# Patient Record
Sex: MALE | Race: BLACK OR AFRICAN AMERICAN | NOT HISPANIC OR LATINO
[De-identification: names, ages, dates, MRNs, and addresses within clinical notes are randomized per-mention and may not be internally consistent; named-entity substitution may affect disease eponyms.]

---

## 2023-03-09 ENCOUNTER — APPOINTMENT (OUTPATIENT)
Dept: HUMAN REPRODUCTION | Facility: CLINIC | Age: 48
End: 2023-03-09

## 2023-04-21 PROBLEM — Z00.00 ENCOUNTER FOR PREVENTIVE HEALTH EXAMINATION: Status: ACTIVE | Noted: 2023-04-21

## 2023-04-25 ENCOUNTER — APPOINTMENT (OUTPATIENT)
Dept: ORTHOPEDIC SURGERY | Facility: CLINIC | Age: 48
End: 2023-04-25
Payer: COMMERCIAL

## 2023-04-25 VITALS
BODY MASS INDEX: 20.53 KG/M2 | HEIGHT: 74 IN | SYSTOLIC BLOOD PRESSURE: 132 MMHG | HEART RATE: 92 BPM | WEIGHT: 160 LBS | DIASTOLIC BLOOD PRESSURE: 86 MMHG

## 2023-04-25 DIAGNOSIS — Z87.09 PERSONAL HISTORY OF OTHER DISEASES OF THE RESPIRATORY SYSTEM: ICD-10-CM

## 2023-04-25 DIAGNOSIS — M54.9 DORSALGIA, UNSPECIFIED: ICD-10-CM

## 2023-04-25 DIAGNOSIS — M47.812 SPONDYLOSIS W/OUT MYELOPATHY OR RADICULOPATHY, CERVICAL REGION: ICD-10-CM

## 2023-04-25 PROCEDURE — 72082 X-RAY EXAM ENTIRE SPI 2/3 VW: CPT

## 2023-04-25 PROCEDURE — 99204 OFFICE O/P NEW MOD 45 MIN: CPT

## 2023-04-25 PROCEDURE — 72040 X-RAY EXAM NECK SPINE 2-3 VW: CPT

## 2023-04-25 PROCEDURE — 72070 X-RAY EXAM THORAC SPINE 2VWS: CPT

## 2023-04-25 PROCEDURE — 72100 X-RAY EXAM L-S SPINE 2/3 VWS: CPT

## 2023-04-27 PROBLEM — Z87.09 HISTORY OF ASTHMA: Status: RESOLVED | Noted: 2023-04-27 | Resolved: 2023-04-27

## 2023-04-27 PROBLEM — M54.9 ACUTE UPPER BACK PAIN: Status: ACTIVE | Noted: 2023-04-27

## 2023-04-27 PROBLEM — M47.812 CERVICAL SPONDYLOSIS: Status: ACTIVE | Noted: 2023-04-27

## 2023-04-27 NOTE — HISTORY OF PRESENT ILLNESS
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  It acts up rarely.  1 month ago he had an increase of midline upper back and neck pain as well as mid upper thoracic pain and lower back pain.  The upper back and neck symptoms do not radiate to the arms.  That pain is graded as a 10.  The lower back pain is graded as a 7 and for 1 week he has had some radiation of the pain to his legs.  The pain is no worse coughing, sneezing or forcing to move his bowels.  He has not had night pain.  He gets some rare numbness and tingling in his arms or legs.  The pain is no worse sitting.  It is worse standing for 30 minutes but no worse walking.  He has been on Naprosyn 500 mg twice a day for a week or so currently without benefit.  He has some complaints of dizziness with prolonged standing which is difficult to explain.  He does have some seemingly innocent daily habits that may well aggravate and propagate neck related symptoms. [Pain Location] : pain [Worsening] : worsening [10] : a maximum pain level of 10/10

## 2023-04-27 NOTE — PHYSICAL EXAM
[de-identified] : He is fully alert and oriented with a normal mood and affect.  He is in no acute distress as a take the history.  He ambulates with a normal gait including tiptoe and heel walking.  There is no evidence of unsteadiness or spasticity.  There are no cutaneous abnormalities or palpable bony defects of the spine.  There is no evidence of shortness of breath or respiratory distress.  There is no paravertebral muscle spasm, sciatic notch tenderness or trochanteric tenderness.  Forward flexion of the spine is easily to 70 degrees without complaints of back pain.  His lower extremity neurological examination revealed 1+ symmetrical reflexes.  Toes are downgoing.  Motor power is normal to manual testing in all lower extremity groups and sensation is normal to light touch in all dermatomes.  Straight leg raising is negative to 90 degrees in the sitting position bilaterally.  His hips and his knees have a full and painless range of motion with normal stability.  Vascular examination shows no evidence of varicosities and there is no lymphedema.  His upper extremity neurological examination again reveals 1+ symmetrical reflexes with normal motor power in all upper extremity groups and normal sensation in all dermatomes.  Shoulder range of motion is full, painless and symmetrical.  Range of motion of the cervical spine shows flexion with the chin reaching to within 1 fingerbreadth of the chest, extension of 70 degrees with rotation of 70 degrees bilaterally and tilt of 30 degrees bilaterally with some mild discomfort at the extremes. [de-identified] : In light of his complaints AP and lateral x-rays of the cervical, thoracic and lumbar spine are obtained.\par \par AP and lateral x-rays of the cervical spine reveal some mild degenerative changes at multiple levels.  Sagittal alignment is normal and there are no destructive changes.\par \par AP and lateral x-rays of the thoracic spine again reveal mild degenerative changes there is a mildly increased kyphosis.  Sagittal alignment is otherwise normal and there are no destructive changes.\par \par AP and lateral x-rays of the lumbar spine reveal a partially sacralized L5.  Sagittal alignment is normal and there are no destructive changes.

## 2023-04-27 NOTE — DISCUSSION/SUMMARY
[Medication Risks Reviewed] : Medication risks reviewed [de-identified] : He will rest and use moist heat.  We discussed some daily activities he needs to avoid which may well be aggravating or propagating the symptoms.  He will continue the Naprosyn 500 mg twice a day which she has been taking for a week.  He will call me in 7 or 10 days and if he has not seen any benefit we will change his the nonsteroidal anti-inflammatory.  I will see him for follow-up in 4 weeks.  Dizziness with standing is something that does not normally related to spine problems.  Today's blood pressure was normal but his resting heart rate is 92 which at his age is evaded from what I would expect and of uncertain significance.  He will speak with his primary care physician regarding his dizziness issues.

## 2023-04-27 NOTE — REASON FOR VISIT
[Initial Visit] : an initial visit for [Degenerative Joint Disease] : degenerative joint disease [Back Pain] : back pain [Neck Pain] : neck pain

## 2023-09-28 ENCOUNTER — APPOINTMENT (OUTPATIENT)
Dept: HUMAN REPRODUCTION | Facility: CLINIC | Age: 48
End: 2023-09-28

## 2024-01-31 ENCOUNTER — APPOINTMENT (OUTPATIENT)
Dept: HUMAN REPRODUCTION | Facility: CLINIC | Age: 49
End: 2024-01-31

## 2024-02-01 ENCOUNTER — APPOINTMENT (OUTPATIENT)
Dept: HUMAN REPRODUCTION | Facility: CLINIC | Age: 49
End: 2024-02-01

## 2024-02-07 ENCOUNTER — APPOINTMENT (OUTPATIENT)
Dept: UROLOGY | Facility: CLINIC | Age: 49
End: 2024-02-07
Payer: COMMERCIAL

## 2024-02-07 VITALS
DIASTOLIC BLOOD PRESSURE: 82 MMHG | SYSTOLIC BLOOD PRESSURE: 119 MMHG | HEIGHT: 74 IN | OXYGEN SATURATION: 98 % | RESPIRATION RATE: 16 BRPM | HEART RATE: 95 BPM | WEIGHT: 160 LBS | BODY MASS INDEX: 20.53 KG/M2

## 2024-02-07 DIAGNOSIS — R68.82 DECREASED LIBIDO: ICD-10-CM

## 2024-02-07 DIAGNOSIS — Z78.9 OTHER SPECIFIED HEALTH STATUS: ICD-10-CM

## 2024-02-07 PROCEDURE — 99204 OFFICE O/P NEW MOD 45 MIN: CPT

## 2024-02-07 NOTE — ASSESSMENT
[FreeTextEntry1] : 48-year-old male who presents for:  1.  Microhematuria-we will proceed with renal bladder ultrasound and cystoscopy per guidelines  2.  Low libido-we will obtain testosterone level.  Advised him to go in the morning for more accurate assessment  Return to clinic for cystoscopy after renal bladder ultrasound

## 2024-02-07 NOTE — PHYSICAL EXAM
[Normal Appearance] : normal appearance [Well Groomed] : well groomed [General Appearance - In No Acute Distress] : no acute distress [Normal Station and Gait] : the gait and station were normal for the patient's age [] : no rash [No Focal Deficits] : no focal deficits [Oriented To Time, Place, And Person] : oriented to person, place, and time [Affect] : the affect was normal [Mood] : the mood was normal

## 2024-02-07 NOTE — REVIEW OF SYSTEMS
[Told you have blood in urine on a urine test] : told blood was present in a urine test [Wake up at night to urinate  How many times?  ___] : wakes up to urinate [unfilled] times during the night [Itching] : itching [Negative] : Heme/Lymph

## 2024-02-07 NOTE — REASON FOR VISIT
[TextEntry] : 48-year-old male who presents for microhematuria and low libido  Patient recently spent the last year in [place].  When he returned he went for his annual wellness and had a urinalysis which showed 2-5 red blood cells.  He denies any gross hematuria.  He denies any urinary symptoms.  He has been drinking significant water since returning and he feels his urinary frequency is appropriate.  He is a never smoker.  He reports that he has had a low libido recently.  He also experiences fatigue.  He denies any marijuana use or narcotic use.  He denies any cardiac issues.  He denies family history of prostate cancer  He has a history of a herniated disc that he goes to physical therapy for  Outside records reviewed: Creatinine 1.1 UA 2-5 RBCs HbA1c 5.8%

## 2024-02-13 ENCOUNTER — APPOINTMENT (OUTPATIENT)
Dept: ULTRASOUND IMAGING | Facility: CLINIC | Age: 49
End: 2024-02-13

## 2024-02-13 PROBLEM — R31.29 MICROHEMATURIA: Status: ACTIVE | Noted: 2024-02-07

## 2024-02-14 ENCOUNTER — APPOINTMENT (OUTPATIENT)
Dept: UROLOGY | Facility: CLINIC | Age: 49
End: 2024-02-14

## 2024-02-14 DIAGNOSIS — R31.29 OTHER MICROSCOPIC HEMATURIA: ICD-10-CM

## 2024-02-15 ENCOUNTER — APPOINTMENT (OUTPATIENT)
Dept: ORTHOPEDIC SURGERY | Facility: CLINIC | Age: 49
End: 2024-02-15
Payer: COMMERCIAL

## 2024-02-15 VITALS — BODY MASS INDEX: 20.53 KG/M2 | HEIGHT: 74 IN | WEIGHT: 160 LBS

## 2024-02-15 DIAGNOSIS — Z78.9 OTHER SPECIFIED HEALTH STATUS: ICD-10-CM

## 2024-02-15 DIAGNOSIS — M54.9 DORSALGIA, UNSPECIFIED: ICD-10-CM

## 2024-02-15 DIAGNOSIS — G89.29 DORSALGIA, UNSPECIFIED: ICD-10-CM

## 2024-02-15 DIAGNOSIS — M47.814 SPONDYLOSIS W/OUT MYELOPATHY OR RADICULOPATHY, THORACIC REGION: ICD-10-CM

## 2024-02-15 PROCEDURE — 99204 OFFICE O/P NEW MOD 45 MIN: CPT

## 2024-02-15 PROCEDURE — 72040 X-RAY EXAM NECK SPINE 2-3 VW: CPT

## 2024-02-15 PROCEDURE — 72070 X-RAY EXAM THORAC SPINE 2VWS: CPT

## 2024-02-15 PROCEDURE — 72100 X-RAY EXAM L-S SPINE 2/3 VWS: CPT

## 2024-02-15 NOTE — DATA REVIEWED
[MRI] : MRI [Thoracic Spine] : thoracic spine [I independently reviewed and interpreted images and report] : I independently reviewed and interpreted images and report [FreeTextEntry1] : T7-T7 HNP wit mild cord compression

## 2024-02-15 NOTE — ASSESSMENT
[FreeTextEntry1] : 48 M with cervical and thoracic HNP  PT massage Discussed NSAIDAS patient deferred.  Discussed major side effects of medication including but not limited to gastritis and acute kidney injury.  He was instructed to take with food and to discontinue use if stomach or esophageal pain developed.  FU 6 weeks if pain persist consider pain management referral r repeat MRI

## 2024-02-15 NOTE — IMAGING
[Straightening consistent with spasm] : Straightening consistent with spasm [Facet arthropathy] : Facet arthropathy [Disc space narrowing] : Disc space narrowing [No bony abnormalities] : No bony abnormalities

## 2024-02-15 NOTE — HISTORY OF PRESENT ILLNESS
[7] : 7 [de-identified] : 02/15/24; 48 year old RHD Male presents today for evaluation of mid thoracic pain that has been present since 2005 basketball injury but has been worsening over the years has. Has completed course of PT in 2023. Patient reports symptoms worsen when standing for extended period of time. Patient reports also having neck pain/stiffness and intermittent RUE numbness in June 2023 treated with HEP/aqua therapy. RUE Symptoms now have resolved but continues to have neck pain. Denies change in dexterity or fine motor skills.  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

## 2024-02-16 ENCOUNTER — OUTPATIENT (OUTPATIENT)
Dept: OUTPATIENT SERVICES | Facility: HOSPITAL | Age: 49
LOS: 1 days | End: 2024-02-16
Payer: COMMERCIAL

## 2024-02-16 ENCOUNTER — APPOINTMENT (OUTPATIENT)
Dept: ULTRASOUND IMAGING | Facility: CLINIC | Age: 49
End: 2024-02-16
Payer: COMMERCIAL

## 2024-02-16 DIAGNOSIS — R31.29 OTHER MICROSCOPIC HEMATURIA: ICD-10-CM

## 2024-02-16 PROCEDURE — 76770 US EXAM ABDO BACK WALL COMP: CPT

## 2024-02-16 PROCEDURE — 76770 US EXAM ABDO BACK WALL COMP: CPT | Mod: 26

## 2024-03-01 ENCOUNTER — APPOINTMENT (OUTPATIENT)
Dept: UROLOGY | Facility: CLINIC | Age: 49
End: 2024-03-01

## 2024-03-13 ENCOUNTER — APPOINTMENT (OUTPATIENT)
Dept: UROLOGY | Facility: CLINIC | Age: 49
End: 2024-03-13
Payer: COMMERCIAL

## 2024-03-13 VITALS
HEIGHT: 74 IN | HEART RATE: 105 BPM | DIASTOLIC BLOOD PRESSURE: 82 MMHG | SYSTOLIC BLOOD PRESSURE: 117 MMHG | BODY MASS INDEX: 20.53 KG/M2 | RESPIRATION RATE: 16 BRPM | OXYGEN SATURATION: 98 % | WEIGHT: 160 LBS | TEMPERATURE: 98.3 F

## 2024-03-13 PROCEDURE — 52000 CYSTOURETHROSCOPY: CPT

## 2024-03-25 ENCOUNTER — NON-APPOINTMENT (OUTPATIENT)
Age: 49
End: 2024-03-25

## 2024-03-25 ENCOUNTER — APPOINTMENT (OUTPATIENT)
Dept: UROLOGY | Facility: CLINIC | Age: 49
End: 2024-03-25
Payer: COMMERCIAL

## 2024-03-25 VITALS — SYSTOLIC BLOOD PRESSURE: 125 MMHG | DIASTOLIC BLOOD PRESSURE: 79 MMHG | OXYGEN SATURATION: 97 % | HEART RATE: 73 BPM

## 2024-03-25 DIAGNOSIS — R30.0 DYSURIA: ICD-10-CM

## 2024-03-25 PROCEDURE — 99214 OFFICE O/P EST MOD 30 MIN: CPT

## 2024-03-25 PROCEDURE — G2211 COMPLEX E/M VISIT ADD ON: CPT

## 2024-03-25 PROCEDURE — 51798 US URINE CAPACITY MEASURE: CPT

## 2024-03-25 RX ORDER — SOLIFENACIN SUCCINATE 10 MG/1
10 TABLET ORAL
Qty: 30 | Refills: 6 | Status: ACTIVE | COMMUNITY
Start: 2024-03-25 | End: 1900-01-01

## 2024-03-25 RX ORDER — PHENAZOPYRIDINE HYDROCHLORIDE 100 MG/1
100 TABLET ORAL 3 TIMES DAILY
Qty: 21 | Refills: 0 | Status: ACTIVE | COMMUNITY
Start: 2024-03-25 | End: 1900-01-01

## 2024-03-25 NOTE — PHYSICAL EXAM
[Normal Appearance] : normal appearance [Well Groomed] : well groomed [General Appearance - In No Acute Distress] : no acute distress [Abdomen Soft] : soft [Abdomen Tenderness] : non-tender [] : no rash [Normal Station and Gait] : the gait and station were normal for the patient's age [Affect] : the affect was normal [Oriented To Time, Place, And Person] : oriented to person, place, and time [Mood] : the mood was normal

## 2024-03-25 NOTE — ASSESSMENT
[FreeTextEntry1] : 48-year-old male who presents with LUTS in the setting of worsening back issues.   We will send his urine for urinalysis urine culture today.  I also sent Pyridium to the pharmacy for the dysuria that he can use as needed.  I will start him on Vesicare 10 mg for his bladder overactivity.  He will look into pelvic floor physical therapy, he was given information about the [facility] site at [facility].  If he has persistent and worsening urinary symptoms, explained that we will likely proceed with urodynamics  Return to clinic in 6 weeks

## 2024-03-25 NOTE — REASON FOR VISIT
[TextEntry] : 48-year-old male who presents for microhematuria and low libido  Patient recently spent the last year in [place].  When he returned he went for his annual wellness and had a urinalysis which showed 2-5 red blood cells.  He denies any gross hematuria.  He denies any urinary symptoms.  He has been drinking significant water since returning and he feels his urinary frequency is appropriate.  He is a never smoker.  He reports that he has had a low libido recently.  He also experiences fatigue.  He denies any marijuana use or narcotic use.  He denies any cardiac issues.  He denies family history of prostate cancer  He has a history of a herniated disc that he goes to physical therapy for  Outside records reviewed: Creatinine 1.1 UA 2-5 RBCs HbA1c 5.8%  Renal bladder ultrasound was normal.  On 3/13 patient underwent cystoscopy which was unremarkable.  Today on 3/25/2024 patient reports that he has had significant worsening in his LUTS.  He reports that everything started about a month ago after a flight to [facility].  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  He feels like his disc issue is progressing as he is having more discomfort in his lower spine.  He reports that he has urinary incontinence post void.  He experiences urinary urgency and bladder spasms shortly after voiding and small volume leakage.  He has nocturia x 1.  The last 2 days he has had burning at the meatus, which is improved today.  He has been going to a chiropractor for his back as well as physical therapy.  However he has noticed changes in his lower extremities.  He also reports that his bowels have changed along the same timeline.  PVR minimal

## 2024-03-27 LAB
APPEARANCE: CLEAR
BACTERIA UR CULT: NORMAL
BACTERIA: NEGATIVE /HPF
BILIRUBIN URINE: NEGATIVE
BLOOD URINE: NEGATIVE
CAST: 0 /LPF
COLOR: YELLOW
EPITHELIAL CELLS: 0 /HPF
GLUCOSE QUALITATIVE U: NEGATIVE MG/DL
KETONES URINE: NEGATIVE MG/DL
LEUKOCYTE ESTERASE URINE: NEGATIVE
MICROSCOPIC-UA: NORMAL
NITRITE URINE: NEGATIVE
PH URINE: 6.5
PROTEIN URINE: NEGATIVE MG/DL
RED BLOOD CELLS URINE: 2 /HPF
SPECIFIC GRAVITY URINE: 1.02
UROBILINOGEN URINE: 1 MG/DL
WHITE BLOOD CELLS URINE: 0 /HPF

## 2024-04-08 ENCOUNTER — APPOINTMENT (OUTPATIENT)
Dept: ORTHOPEDIC SURGERY | Facility: CLINIC | Age: 49
End: 2024-04-08

## 2024-05-06 ENCOUNTER — APPOINTMENT (OUTPATIENT)
Dept: UROLOGY | Facility: CLINIC | Age: 49
End: 2024-05-06
Payer: COMMERCIAL

## 2024-05-06 VITALS
HEIGHT: 74 IN | TEMPERATURE: 98 F | HEART RATE: 97 BPM | SYSTOLIC BLOOD PRESSURE: 125 MMHG | OXYGEN SATURATION: 97 % | BODY MASS INDEX: 20.53 KG/M2 | WEIGHT: 160 LBS | DIASTOLIC BLOOD PRESSURE: 81 MMHG | RESPIRATION RATE: 16 BRPM

## 2024-05-06 DIAGNOSIS — R32 UNSPECIFIED URINARY INCONTINENCE: ICD-10-CM

## 2024-05-06 DIAGNOSIS — A60.02 HERPESVIRAL INFECTION OF OTHER MALE GENITAL ORGANS: ICD-10-CM

## 2024-05-06 PROCEDURE — G2211 COMPLEX E/M VISIT ADD ON: CPT

## 2024-05-06 PROCEDURE — 99214 OFFICE O/P EST MOD 30 MIN: CPT

## 2024-05-06 RX ORDER — VALACYCLOVIR 500 MG/1
500 TABLET, FILM COATED ORAL TWICE DAILY
Qty: 6 | Refills: 1 | Status: ACTIVE | COMMUNITY
Start: 2024-05-06 | End: 1900-01-01

## 2024-05-06 NOTE — ASSESSMENT
[FreeTextEntry1] : 48-year-old male who presents for OAB and penile tingling  Patient is leaving for [place] soon.  Advised him to continue pelvic floor physical therapy and we would reassess when he returns.  If he continues to have symptoms we would proceed with urodynamics.  Regard to his penile tingling, patient has a history of genital herpes.  I have sent valacyclovir to the pharmacy for him to take when he has a flareup.  Return to clinic in September 2024 when he is back from [place]

## 2024-05-06 NOTE — REASON FOR VISIT
[TextEntry] : 48-year-old male who presents for microhematuria and low libido  Patient recently spent the last year in [place].  When he returned he went for his annual wellness and had a urinalysis which showed 2-5 red blood cells.  He denies any gross hematuria.  He denies any urinary symptoms.  He has been drinking significant water since returning and he feels his urinary frequency is appropriate.  He is a never smoker.  He reports that he has had a low libido recently.  He also experiences fatigue.  He denies any marijuana use or narcotic use.  He denies any cardiac issues.  He denies family history of prostate cancer  He has a history of a herniated disc that he goes to physical therapy for  Outside records reviewed: Creatinine 1.1 UA 2-5 RBCs HbA1c 5.8%  Renal bladder ultrasound was normal.  On 3/13 patient underwent cystoscopy which was unremarkable.  Today on 3/25/2024 patient reports that he has had significant worsening in his LUTS.  He reports that everything started about a month ago after a flight to [facility].  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  He feels like his disc issue is progressing as he is having more discomfort in his lower spine.  He reports that he has urinary incontinence post void.  He experiences urinary urgency and bladder spasms shortly after voiding and small volume leakage.  He has nocturia x 1.  The last 2 days he has had burning at the meatus, which is improved today.  He has been going to a chiropractor for his back as well as physical therapy.  However he has noticed changes in his lower extremities.  He also reports that his bowels have changed along the same timeline.  PVR minimal Patient started on Vesicare 10 mg for OAB and provided information regarding pelvic floor physical therapy.  Pyridium was also sent to the pharmacy for dysuria.  Urine culture negative.  Today on 5/6 patient reports that he feels the Vesicare gave him suprapubic pressure.  He has been going to pelvic floor physical therapy in the city and is eager to see if this improves his urinary frequency.  He has been exercising more and recently developed musculoskeletal discomfort in his right lower back.  He is planning to follow-up with his orthopedic surgeon.  He is also reporting tingling on the glans of his penis during intercourse.  He has a history of genital herpes.  He is leaving for [place] soon and will be there for 4 months.

## 2024-05-06 NOTE — PHYSICAL EXAM
[Normal Appearance] : normal appearance [Well Groomed] : well groomed [General Appearance - In No Acute Distress] : no acute distress [Urethral Meatus] : meatus normal [Penis Abnormality] : normal circumcised penis [Normal Station and Gait] : the gait and station were normal for the patient's age [] : no rash [Oriented To Time, Place, And Person] : oriented to person, place, and time [Affect] : the affect was normal [Mood] : the mood was normal [de-identified] : slight discoloration on glans, no vesicles or lesions, nontender

## 2024-05-10 ENCOUNTER — APPOINTMENT (OUTPATIENT)
Dept: ORTHOPEDIC SURGERY | Facility: CLINIC | Age: 49
End: 2024-05-10
Payer: COMMERCIAL

## 2024-05-10 VITALS — HEIGHT: 74 IN | WEIGHT: 160 LBS | BODY MASS INDEX: 20.53 KG/M2

## 2024-05-10 DIAGNOSIS — M47.816 SPONDYLOSIS W/OUT MYELOPATHY OR RADICULOPATHY, LUMBAR REGION: ICD-10-CM

## 2024-05-10 DIAGNOSIS — M54.50 LOW BACK PAIN, UNSPECIFIED: ICD-10-CM

## 2024-05-10 PROCEDURE — 99213 OFFICE O/P EST LOW 20 MIN: CPT

## 2024-05-10 NOTE — HISTORY OF PRESENT ILLNESS
[7] : 7 [de-identified] : 5/10/24 Pain is worse in lower back.  having urinary frequency.  PVR normal per uro.   Also with continued neck pain. Slightly imrpoved with PT however. Did take a break from PT for last week./  02/15/24; 48 year old RHD Male presents today for evaluation of mid thoracic pain that has been present since 2005 basketball injury but has been worsening over the years has. Has completed course of PT in 2023. Patient reports symptoms worsen when standing for extended period of time. Patient reports also having neck pain/stiffness and intermittent RUE numbness in June 2023 treated with HEP/aqua therapy. RUE Symptoms now have resolved but continues to have neck pain. Denies change in dexterity or fine motor skills.  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 states no sig change. stopped going to PT.  [] : no

## 2024-05-10 NOTE — ASSESSMENT
[FreeTextEntry1] : 48 M with cervical and thoracic HNP .  Also wtih severe low back pain.  MRI C and L spine  FU after MRI

## 2024-05-29 ENCOUNTER — APPOINTMENT (OUTPATIENT)
Dept: ORTHOPEDIC SURGERY | Facility: CLINIC | Age: 49
End: 2024-05-29

## 2024-09-13 ENCOUNTER — APPOINTMENT (OUTPATIENT)
Dept: ORTHOPEDIC SURGERY | Facility: CLINIC | Age: 49
End: 2024-09-13
Payer: COMMERCIAL

## 2024-09-13 VITALS — WEIGHT: 160 LBS | BODY MASS INDEX: 20.53 KG/M2 | HEIGHT: 74 IN

## 2024-09-13 DIAGNOSIS — M47.814 SPONDYLOSIS W/OUT MYELOPATHY OR RADICULOPATHY, THORACIC REGION: ICD-10-CM

## 2024-09-13 DIAGNOSIS — M47.812 SPONDYLOSIS W/OUT MYELOPATHY OR RADICULOPATHY, CERVICAL REGION: ICD-10-CM

## 2024-09-13 DIAGNOSIS — M54.9 DORSALGIA, UNSPECIFIED: ICD-10-CM

## 2024-09-13 DIAGNOSIS — G89.29 DORSALGIA, UNSPECIFIED: ICD-10-CM

## 2024-09-13 PROCEDURE — 99213 OFFICE O/P EST LOW 20 MIN: CPT

## 2024-09-13 NOTE — HISTORY OF PRESENT ILLNESS
[Mid-back] : mid-back [Lower back] : lower back [7] : 7 [Full time] : Work status: full time [de-identified] : 5/10/24 Pain is worse in lower back.  having urinary frequency.  PVR normal per uro.   Also with continued neck pain. Slightly imrpoved with PT however. Did take a break from PT for last week./  02/15/24; 48 year old RHD Male presents today for evaluation of mid thoracic pain that has been present since 2005 basketball injury but has been worsening over the years has. Has completed course of PT in 2023. Patient reports symptoms worsen when standing for extended period of time. Patient reports also having neck pain/stiffness and intermittent RUE numbness in June 2023 treated with HEP/aqua therapy. RUE Symptoms now have resolved but continues to have neck pain. Denies change in dexterity or fine motor skills.  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 states no sig change. stopped going to PT.  9/13/2024 Patient feels improvement since last visit. Low back resolved.  Neck and mid back pain persists.  Some continued pain. He would like to discuss doing MRI  [] : Post Surgical Visit: no [de-identified] : 5/10/2024 [de-identified] : Dr. Jiang  [de-identified] : Teacher

## 2024-09-13 NOTE — ASSESSMENT
[FreeTextEntry1] : 48 M with cervical and thoracic HNP .  Also wtih severe low back pain.  low back pain resovled MRI C and T spine to assess HNP Has been out of country for work. [place] begin treated [place] HE[place]

## 2024-09-20 ENCOUNTER — APPOINTMENT (OUTPATIENT)
Dept: UROLOGY | Facility: CLINIC | Age: 49
End: 2024-09-20

## 2024-09-23 NOTE — REASON FOR VISIT
[TextEntry] : 48-year-old male who presents for microhematuria and low libido  Patient recently spent the last year in [place].  When he returned he went for his annual wellness and had a urinalysis which showed 2-5 red blood cells.  He denies any gross hematuria.  He denies any urinary symptoms.  He has been drinking significant water since returning and he feels his urinary frequency is appropriate.  He is a never smoker.  He reports that he has had a low libido recently.  He also experiences fatigue.  He denies any marijuana use or narcotic use.  He denies any cardiac issues.  He denies family history of prostate cancer  He has a history of a herniated disc that he goes to physical therapy for  Outside records reviewed: Creatinine 1.1 UA 2-5 RBCs HbA1c 5.8%  Renal bladder ultrasound was normal.  On 3/13 patient underwent cystoscopy which was unremarkable.  Today on 3/25/2024 patient reports that he has had significant worsening in his LUTS.  He reports that everything started about a month ago after a flight to [facility].  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  He feels like his disc issue is progressing as he is having more discomfort in his lower spine.  He reports that he has urinary incontinence post void.  He experiences urinary urgency and bladder spasms shortly after voiding and small volume leakage.  He has nocturia x 1.  The last 2 days he has had burning at the meatus, which is improved today.  He has been going to a chiropractor for his back as well as physical therapy.  However he has noticed changes in his lower extremities.  He also reports that his bowels have changed along the same timeline.  PVR minimal Patient started on Vesicare 10 mg for OAB and provided information regarding pelvic floor physical therapy.  Pyridium was also sent to the pharmacy for dysuria.  Urine culture negative.  Today on 5/6 patient reports that he feels the Vesicare gave him suprapubic pressure.  He has been going to pelvic floor physical therapy in the city and is eager to see if this improves his urinary frequency.  He has been exercising more and recently developed musculoskeletal discomfort in his right lower back.  He is planning to follow-up with his orthopedic surgeon.  He is also reporting tingling on the glans of his penis during intercourse.  He has a history of genital herpes.  He is leaving for [place] soon and will be there for 4 months. Continue PFPT, sent valcyclovir, and plan for UDS if persistent sx

## 2024-09-28 ENCOUNTER — RESULT REVIEW (OUTPATIENT)
Age: 49
End: 2024-09-28

## 2024-10-11 ENCOUNTER — APPOINTMENT (OUTPATIENT)
Dept: ORTHOPEDIC SURGERY | Facility: CLINIC | Age: 49
End: 2024-10-11
Payer: COMMERCIAL

## 2024-10-11 VITALS — WEIGHT: 160 LBS | HEIGHT: 74 IN | BODY MASS INDEX: 20.53 KG/M2

## 2024-10-11 PROCEDURE — 99213 OFFICE O/P EST LOW 20 MIN: CPT

## 2024-10-11 NOTE — ASSESSMENT
[FreeTextEntry1] : 48 M with cervical and thoracic HNP .  Also with severe low back pain.  low back pain resolved MRI C and T spine to assess HNP MRI with severe cord compression without cord signal change. No myelopathic symptoms.  Educated on red flag signs and discussed he is a high risk for cord injury if he were to have a trauma or fall Discussed options for surgical decompression. including for posterior cervical decompression and fusion vs observation.  Discussed given severity of cord compression will likely need surgery and dangers of observation Will c/w PT and conservative care.

## 2024-10-11 NOTE — HISTORY OF PRESENT ILLNESS
[Mid-back] : mid-back [Lower back] : lower back [7] : 7 [Full time] : Work status: full time [de-identified] : 5/10/24 Pain is worse in lower back.  having urinary frequency.  PVR normal per uro.   Also with continued neck pain. Slightly imrpoved with PT however. Did take a break from PT for last week./  02/15/24; 48 year old RHD Male presents today for evaluation of mid thoracic pain that has been present since 2005 basketball injury but has been worsening over the years has. Has completed course of PT in 2023. Patient reports symptoms worsen when standing for extended period of time. Patient reports also having neck pain/stiffness and intermittent RUE numbness in June 2023 treated with HEP/aqua therapy. RUE Symptoms now have resolved but continues to have neck pain. Denies change in dexterity or fine motor skills.  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 states no sig change. stopped going to PT.  9/13/2024 Patient feels improvement since last visit. Low back resolved.  Neck and mid back pain persists.  Some continued pain. He would like to discuss doing MRI  [] : Post Surgical Visit: no [de-identified] : 9/13/2024 [de-identified] : Dr. Mcwilliams  [de-identified] : MRI [de-identified] : Teacher

## 2024-10-11 NOTE — DATA REVIEWED
[Thoracic Spine] : thoracic spine [MRI] : MRI [Cervical Spine] : cervical spine [I independently reviewed and interpreted images and report] : I independently reviewed and interpreted images and report [FreeTextEntry1] : T7-T7 HNP wit mild cord compression  [FreeTextEntry2] : multilevel degen changes with cord compression. no cord signal change

## 2024-10-15 ENCOUNTER — RESULT REVIEW (OUTPATIENT)
Age: 49
End: 2024-10-15

## 2024-10-18 ENCOUNTER — APPOINTMENT (OUTPATIENT)
Dept: ORTHOPEDIC SURGERY | Facility: CLINIC | Age: 49
End: 2024-10-18
Payer: COMMERCIAL

## 2024-10-18 VITALS — BODY MASS INDEX: 20.53 KG/M2 | WEIGHT: 160 LBS | HEIGHT: 74 IN

## 2024-10-18 DIAGNOSIS — M47.812 SPONDYLOSIS W/OUT MYELOPATHY OR RADICULOPATHY, CERVICAL REGION: ICD-10-CM

## 2024-10-18 DIAGNOSIS — M54.9 DORSALGIA, UNSPECIFIED: ICD-10-CM

## 2024-10-18 DIAGNOSIS — G95.20 UNSPECIFIED CORD COMPRESSION: ICD-10-CM

## 2024-10-18 PROCEDURE — 99213 OFFICE O/P EST LOW 20 MIN: CPT

## 2024-10-18 NOTE — DATA REVIEWED
[Cervical Spine] : cervical spine [MRI] : MRI [Thoracic Spine] : thoracic spine [I independently reviewed and interpreted images and report] : I independently reviewed and interpreted images and report [FreeTextEntry1] : T7-T7 HNP wit mild cord compression  [FreeTextEntry2] : multilevel degen changes with cord compression. no cord signal change [FreeTextEntry3] : HNP without cord compression

## 2024-10-18 NOTE — ASSESSMENT
[FreeTextEntry1] : 48 M with cervical and thoracic HNP .  Also with severe low back pain.  low back pain resolved  MRI c spine with severe cord compression without cord signal change. No myelopathic symptoms.  Educated on red flag signs and discussed he is a high risk for cord injury if he were to have a trauma or fall Discussed options for surgical decompression. including for posterior cervical decompression and fusion vs observation.  MRI T spine with HNP without cord compression Discussed given severity of cord compression will likely need surgery and dangers of observation Will c/w PT and conservative care.

## 2024-10-18 NOTE — HISTORY OF PRESENT ILLNESS
[Mid-back] : mid-back [Lower back] : lower back [7] : 7 [Full time] : Work status: full time [de-identified] : 10/18/2024:  5/10/24 Pain is worse in lower back.  having urinary frequency.  PVR normal per uro.   Also with continued neck pain. Slightly imrpoved with PT however. Did take a break from PT for last week./  02/15/24; 48 year old RHD Male presents today for evaluation of mid thoracic pain that has been present since 2005 basketball injury but has been worsening over the years has. Has completed course of PT in 2023. Patient reports symptoms worsen when standing for extended period of time. Patient reports also having neck pain/stiffness and intermittent RUE numbness in June 2023 treated with HEP/aqua therapy. RUE Symptoms now have resolved but continues to have neck pain. Denies change in dexterity or fine motor skills.  No PmHx All: Shrimp (Anaphylaxis) Occupation: Teacher- [employer]  05/10/2024: here to follow up on the L-spine. states no sig change. stopped going to PT.  9/13/2024 Patient feels improvement since last visit. Low back resolved.  Neck and mid back pain persists.  Some continued pain. He would like to discuss doing MRI  [] : Post Surgical Visit: no [de-identified] : 9/13/2024 [de-identified] : Dr. Mcwilliams  [de-identified] : MRI [de-identified] : Teacher

## 2024-12-02 ENCOUNTER — APPOINTMENT (OUTPATIENT)
Dept: UROLOGY | Facility: CLINIC | Age: 49
End: 2024-12-02
Payer: COMMERCIAL

## 2024-12-02 DIAGNOSIS — R32 UNSPECIFIED URINARY INCONTINENCE: ICD-10-CM

## 2024-12-02 DIAGNOSIS — R10.2 PELVIC AND PERINEAL PAIN: ICD-10-CM

## 2024-12-02 PROCEDURE — G2211 COMPLEX E/M VISIT ADD ON: CPT | Mod: NC

## 2024-12-02 PROCEDURE — 99213 OFFICE O/P EST LOW 20 MIN: CPT

## 2024-12-02 PROCEDURE — 99203 OFFICE O/P NEW LOW 30 MIN: CPT

## 2024-12-02 NOTE — PHYSICAL EXAM
[Normal Appearance] : normal appearance [Well Groomed] : well groomed [General Appearance - In No Acute Distress] : no acute distress [Normal Station and Gait] : the gait and station were normal for the patient's age [] : no rash [Oriented To Time, Place, And Person] : oriented to person, place, and time [Affect] : the affect was normal [Mood] : the mood was normal

## 2024-12-02 NOTE — REASON FOR VISIT
[TextEntry] : 48-year-old male who presents for microhematuria and low libido  Patient recently spent the last year in [place].  When he returned he went for his annual wellness and had a urinalysis which showed 2-5 red blood cells.  He denies any gross hematuria.  He denies any urinary symptoms.  He has been drinking significant water since returning and he feels his urinary frequency is appropriate.  He is a never smoker.  He reports that he has had a low libido recently.  He also experiences fatigue.  He denies any marijuana use or narcotic use.  He denies any cardiac issues.  He denies family history of prostate cancer  He has a history of a herniated disc that he goes to physical therapy for  Outside records reviewed: Creatinine 1.1 UA 2-5 RBCs HbA1c 5.8%  Renal bladder ultrasound was normal.  On 3/13 patient underwent cystoscopy which was unremarkable.  Today on 3/25/2024 patient reports that he has had significant worsening in his LUTS.  He reports that everything started about a month ago after a flight to [facility].  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  He feels like his disc issue is progressing as he is having more discomfort in his lower spine.  He reports that he has urinary incontinence post void.  He experiences urinary urgency and bladder spasms shortly after voiding and small volume leakage.  He has nocturia x 1.  The last 2 days he has had burning at the meatus, which is improved today.  He has been going to a chiropractor for his back as well as physical therapy.  However he has noticed changes in his lower extremities.  He also reports that his bowels have changed along the same timeline.  PVR minimal Patient started on Vesicare 10 mg for OAB and provided information regarding pelvic floor physical therapy.  Pyridium was also sent to the pharmacy for dysuria.  Urine culture negative.  Today on 5/6 patient reports that he feels the Vesicare gave him suprapubic pressure.  He has been going to pelvic floor physical therapy in the city and is eager to see if this improves his urinary frequency.  He has been exercising more and recently developed musculoskeletal discomfort in his right lower back.  He is planning to follow-up with his orthopedic surgeon.  He is also reporting tingling on the glans of his penis during intercourse.  He has a history of genital herpes.  He is leaving for [place] soon and will be there for 4 months. Genitourinary: meatus normal and normal circumcised penis . slight discoloration on glans, no vesicles or lesions, nontender.    Advised to continue pelvic floor PT.  If his symptoms persist, we will proceed with urodynamics.  He was also sent valacyclovir for his penile tingling.  Today on 12/2 patient reports new pain associated with sitting.  He also develops a urinary frequency with prolonged sitting.  He also feels like his postvoid dribble is mainly associated with lifting his left leg.  He has been doing pelvic floor exercises and has noticed no difference.  He denies any recent trauma to his spine.

## 2024-12-03 LAB
APPEARANCE: CLEAR
BACTERIA: NEGATIVE /HPF
BILIRUBIN URINE: NEGATIVE
BLOOD URINE: NEGATIVE
CAST: 0 /LPF
COLOR: YELLOW
EPITHELIAL CELLS: 0 /HPF
GLUCOSE QUALITATIVE U: NEGATIVE MG/DL
KETONES URINE: NEGATIVE MG/DL
LEUKOCYTE ESTERASE URINE: NEGATIVE
MICROSCOPIC-UA: NORMAL
NITRITE URINE: NEGATIVE
PH URINE: 6
PROTEIN URINE: NEGATIVE MG/DL
RED BLOOD CELLS URINE: 1 /HPF
SPECIFIC GRAVITY URINE: 1.01
UROBILINOGEN URINE: 0.2 MG/DL
WHITE BLOOD CELLS URINE: 0 /HPF

## 2024-12-04 LAB — BACTERIA UR CULT: NORMAL

## 2025-04-02 ENCOUNTER — APPOINTMENT (OUTPATIENT)
Dept: ORTHOPEDIC SURGERY | Facility: CLINIC | Age: 50
End: 2025-04-02